# Patient Record
Sex: MALE | Race: OTHER | HISPANIC OR LATINO | URBAN - METROPOLITAN AREA
[De-identification: names, ages, dates, MRNs, and addresses within clinical notes are randomized per-mention and may not be internally consistent; named-entity substitution may affect disease eponyms.]

---

## 2024-10-28 ENCOUNTER — EMERGENCY (EMERGENCY)
Facility: HOSPITAL | Age: 49
LOS: 1 days | Discharge: HOME | End: 2024-10-28
Attending: EMERGENCY MEDICINE
Payer: MEDICAID

## 2024-10-28 VITALS
SYSTOLIC BLOOD PRESSURE: 153 MMHG | RESPIRATION RATE: 18 BRPM | TEMPERATURE: 98 F | DIASTOLIC BLOOD PRESSURE: 88 MMHG | HEART RATE: 99 BPM | OXYGEN SATURATION: 98 %

## 2024-10-28 VITALS
SYSTOLIC BLOOD PRESSURE: 123 MMHG | OXYGEN SATURATION: 100 % | HEART RATE: 74 BPM | DIASTOLIC BLOOD PRESSURE: 86 MMHG | RESPIRATION RATE: 20 BRPM

## 2024-10-28 LAB
ALBUMIN SERPL ELPH-MCNC: 4.2 G/DL — SIGNIFICANT CHANGE UP (ref 3.5–5)
ALP SERPL-CCNC: 62 U/L — SIGNIFICANT CHANGE UP (ref 40–120)
ALT FLD-CCNC: 48 U/L DA — SIGNIFICANT CHANGE UP (ref 10–60)
ANION GAP SERPL CALC-SCNC: 7 MMOL/L — SIGNIFICANT CHANGE UP (ref 5–17)
AST SERPL-CCNC: 31 U/L — SIGNIFICANT CHANGE UP (ref 10–40)
BASOPHILS # BLD AUTO: 0.07 K/UL — SIGNIFICANT CHANGE UP (ref 0–0.2)
BASOPHILS NFR BLD AUTO: 0.8 % — SIGNIFICANT CHANGE UP (ref 0–2)
BILIRUB SERPL-MCNC: 0.7 MG/DL — SIGNIFICANT CHANGE UP (ref 0.2–1.2)
BUN SERPL-MCNC: 9 MG/DL — SIGNIFICANT CHANGE UP (ref 7–18)
CALCIUM SERPL-MCNC: 9 MG/DL — SIGNIFICANT CHANGE UP (ref 8.4–10.5)
CHLORIDE SERPL-SCNC: 104 MMOL/L — SIGNIFICANT CHANGE UP (ref 96–108)
CO2 SERPL-SCNC: 27 MMOL/L — SIGNIFICANT CHANGE UP (ref 22–31)
CREAT SERPL-MCNC: 0.92 MG/DL — SIGNIFICANT CHANGE UP (ref 0.5–1.3)
EGFR: 102 ML/MIN/1.73M2 — SIGNIFICANT CHANGE UP
EOSINOPHIL # BLD AUTO: 0.68 K/UL — HIGH (ref 0–0.5)
EOSINOPHIL NFR BLD AUTO: 8 % — HIGH (ref 0–6)
FLUAV AG NPH QL: SIGNIFICANT CHANGE UP
FLUBV AG NPH QL: SIGNIFICANT CHANGE UP
GLUCOSE SERPL-MCNC: 97 MG/DL — SIGNIFICANT CHANGE UP (ref 70–99)
HCT VFR BLD CALC: 48.1 % — SIGNIFICANT CHANGE UP (ref 39–50)
HGB BLD-MCNC: 17 G/DL — SIGNIFICANT CHANGE UP (ref 13–17)
IMM GRANULOCYTES NFR BLD AUTO: 0.4 % — SIGNIFICANT CHANGE UP (ref 0–0.9)
LIDOCAIN IGE QN: 35 U/L — SIGNIFICANT CHANGE UP (ref 13–75)
LYMPHOCYTES # BLD AUTO: 2.16 K/UL — SIGNIFICANT CHANGE UP (ref 1–3.3)
LYMPHOCYTES # BLD AUTO: 25.4 % — SIGNIFICANT CHANGE UP (ref 13–44)
MCHC RBC-ENTMCNC: 31.5 PG — SIGNIFICANT CHANGE UP (ref 27–34)
MCHC RBC-ENTMCNC: 35.3 GM/DL — SIGNIFICANT CHANGE UP (ref 32–36)
MCV RBC AUTO: 89.1 FL — SIGNIFICANT CHANGE UP (ref 80–100)
MONOCYTES # BLD AUTO: 0.54 K/UL — SIGNIFICANT CHANGE UP (ref 0–0.9)
MONOCYTES NFR BLD AUTO: 6.3 % — SIGNIFICANT CHANGE UP (ref 2–14)
NEUTROPHILS # BLD AUTO: 5.03 K/UL — SIGNIFICANT CHANGE UP (ref 1.8–7.4)
NEUTROPHILS NFR BLD AUTO: 59.1 % — SIGNIFICANT CHANGE UP (ref 43–77)
NRBC # BLD: 0 /100 WBCS — SIGNIFICANT CHANGE UP (ref 0–0)
PLATELET # BLD AUTO: 338 K/UL — SIGNIFICANT CHANGE UP (ref 150–400)
POTASSIUM SERPL-MCNC: 3.6 MMOL/L — SIGNIFICANT CHANGE UP (ref 3.5–5.3)
POTASSIUM SERPL-SCNC: 3.6 MMOL/L — SIGNIFICANT CHANGE UP (ref 3.5–5.3)
PROT SERPL-MCNC: 8.3 G/DL — SIGNIFICANT CHANGE UP (ref 6–8.3)
RBC # BLD: 5.4 M/UL — SIGNIFICANT CHANGE UP (ref 4.2–5.8)
RBC # FLD: 12.6 % — SIGNIFICANT CHANGE UP (ref 10.3–14.5)
RSV RNA NPH QL NAA+NON-PROBE: SIGNIFICANT CHANGE UP
SARS-COV-2 RNA SPEC QL NAA+PROBE: SIGNIFICANT CHANGE UP
SODIUM SERPL-SCNC: 138 MMOL/L — SIGNIFICANT CHANGE UP (ref 135–145)
TSH SERPL-MCNC: 1.32 UU/ML — SIGNIFICANT CHANGE UP (ref 0.34–4.82)
WBC # BLD: 8.51 K/UL — SIGNIFICANT CHANGE UP (ref 3.8–10.5)
WBC # FLD AUTO: 8.51 K/UL — SIGNIFICANT CHANGE UP (ref 3.8–10.5)

## 2024-10-28 PROCEDURE — 80053 COMPREHEN METABOLIC PANEL: CPT

## 2024-10-28 PROCEDURE — 93005 ELECTROCARDIOGRAM TRACING: CPT

## 2024-10-28 PROCEDURE — 83690 ASSAY OF LIPASE: CPT

## 2024-10-28 PROCEDURE — 36415 COLL VENOUS BLD VENIPUNCTURE: CPT

## 2024-10-28 PROCEDURE — 84443 ASSAY THYROID STIM HORMONE: CPT

## 2024-10-28 PROCEDURE — 85025 COMPLETE CBC W/AUTO DIFF WBC: CPT

## 2024-10-28 PROCEDURE — 93010 ELECTROCARDIOGRAM REPORT: CPT

## 2024-10-28 PROCEDURE — 99284 EMERGENCY DEPT VISIT MOD MDM: CPT

## 2024-10-28 PROCEDURE — 36000 PLACE NEEDLE IN VEIN: CPT

## 2024-10-28 PROCEDURE — 99283 EMERGENCY DEPT VISIT LOW MDM: CPT | Mod: 25

## 2024-10-28 PROCEDURE — 87637 SARSCOV2&INF A&B&RSV AMP PRB: CPT

## 2024-10-28 NOTE — ED PROVIDER NOTE - PATIENT PORTAL LINK FT
You can access the FollowMyHealth Patient Portal offered by [facility] by registering at the following website: [email or web address]. By joining [email or web address] [facility]’s FollowMyHealth portal, you will also be able to view your health information using other applications (apps) compatible with our system.
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
## 2024-10-28 NOTE — ED ADULT NURSE NOTE - NS_SISCREENINGSR_GEN_ALL_ED
Discharge paperwork given to pt's Mother. All questions and concerns addressed at this time. Pt discharged home with Mother in no acute distress and acting age appropriate. Education given to pt's Mother about obtaining covid-19 results and about following up with PCP. Mother verbalized understanding and has no further questions at this time. Negative

## 2024-10-28 NOTE — ED PROVIDER NOTE - OBJECTIVE STATEMENT
49-year-old male with history of polyneuropathy being managed at [facility] currently on gabapentin 3 times daily presenting with episodes of burning pain to body with tingling and numbness that last up to 30 minutes.  Episodes happen randomly.  Patient has been feeling symptoms over the past year.  States he had numerous MRIs of his brain and spine which excluded other causes such as multiple sclerosis.  Patient was walking to PCP when he had an episode and he found himself near the hospital and came to get it "checked out".  Patient is ambulatory, lives with wife.  Denies any headache, fever, trauma, bowel or bladder symptoms.

## 2024-10-28 NOTE — ED PROVIDER NOTE - CLINICAL SUMMARY MEDICAL DECISION MAKING FREE TEXT BOX
.    49-year-old male with a recently diagnosed polyneuropathy being worked up at [facility] here due to episode of burning pain and tingling to body.  Patient currently is asymptomatic.  Stable vitals. Plan to check labs and provide neurology follow-up at [facility] if desired. [place]  Jase Roa [id number] used for encounter

## 2024-10-28 NOTE — ED PROVIDER NOTE - NSFOLLOWUPINSTRUCTIONS_ED_ALL_ED_FT
Tiene alona afección que necesita evaluación por parte de un neurólogo.  Ya está siendo evaluado en un hospital externo por alona condición neurológica.  Puede realizar un seguimiento con un neurólogo afiliado a [facility] si desea otra opinión sobre restrepo afección.  Consulte la información adjunta, especialmente los signos o síntomas, para regresar lo antes posible al departamento de emergencias.    Neuropatía periférica    LO QUE NECESITA SABER:    ¿Qué es la neuropatía periférica (NP)?La NP es un tipo de daño nervioso que puede desarrollarse cuando los nervios periféricos están dañados. Los nervios periféricos están situados fuera del cerebro y la médula hunter. Estos nervios envían información desde el cerebro y la médula hunter al heber del cuerpo. Los daños en estos nervios pueden ralentizar o detener restrepo capacidad de enviar señales. La NP es más común en las piernas y los pies. También puede afectar a las funciones corporales, risa la micción o la digestión.    ¿Qué causa la NP?    Traumatismos o lesiones    Infecciones, risa el herpes zóster, el VIH o la enfermedad de Lyme    Condiciones de nery, risa la artritis reumatoide, el cáncer o el lupus    Presión sobre el nervio por alona escayola o por movimientos repetidos, risa teclear    Abuso de alcohol    Exposición a toxinas, risa productos químicos industriales, plomo o noelle  ¿Cuáles son los signos y síntomas de la NP?Mayi síntomas dependen de los tipos de nervios dañados y de restrepo localización. Los siguientes son algunos de los signos y síntomas más comunes:    Dolor nikky, punzante, ardor u hormigueo en las piernas, los pies, los brazos o las grupo    Dificultad para caminar o para mantener el equilibrio    Debilidad o dificultad para sujetar las cosas    Pérdida del sentido del tacto o adormecimiento    Le salen moretones con facilidad    Dificultad para controlar la vejiga o los intestinos, o para tener relaciones sexuales  ¿Cómo se diagnostica la NP?Restrepo médico lo examinará y le hará preguntas acerca de mayi síntomas. Es posible que también le pregunte si tiene otras condiciones de nery y sobre el historial de nery de restrepo mayco. Restrepo médico puede tocar restrepo piel en diferentes áreas con alona kwasi de algodón o un alfiler para comprobar restrepo sentido del tacto. También podría comprobar qué monroe barber usted puede sentir el calor y el frío. Restrepo médico le pedirá que denise movimientos sencillos. Por ejemplo, es posible que le pida que camine o mueva los dedos. Es posible que también necesite alguno de los siguientes tratamientos:    Los análisis de sangrepuede mostrar las condiciones que pueden estar causando restrepo NP.    Alona prueba de filamentospuede mostrar restrepo sensibilidad al tacto. Alona suave fibra de nailon se cepilla sobre zonas de restrepo piel.    Las pruebas sensorialespueden mostrar cómo responden mayi nervios a la vibración o a los cambios de temperatura.    Las pruebas nerviosas y muscularespueden mostrar la rapidez con la que mayi nervios o músculos responden a las señales eléctricas.    Alona biopsia de nerviose usa para forest alona muestra de los nervios para examinarlos.  ¿Cómo se trata la NP?    Medicamentos:  AINEcomo el ibuprofeno, ayudan a disminuir la inflamación, el dolor y la fiebre. Abimbola medicamento está disponible con o sin alona receta médica. Los WEN pueden causar sangrado estomacal o problemas renales en ciertas personas. Si usted elan un medicamento anticoagulante, siempre pregúntele a retsrepo médico si los WEN son seguros para usted. Siempre erica la etiqueta de abimbola medicamento y siga las instrucciones.    Los medicamentos anticonvulsivos y antidepresivospueden ayudar a disminuir el dolor en los nervios. Estos medicamentos cambian la forma en que los nervios y el cerebro comunican el dolor.    Los tratamientos de uso tópico, risa cremas o parches, también pueden ayudar a disminuir el dolor.    Un fisioterapeutale enseñará movimientos y ejercicios para que recupere el movimiento y la fuerza y se alivie el dolor en los nervios.    La unidad de TENS (estimulación eléctrica del nervio transcutáneo)estimula los nervios y puede disminuir el dolor. Las almohadillas se adhieren a la piel y se aplica alona suave corriente.  ¿Cómo puedo controlar la NP?    El tratamiento de las enfermedades subyacentes es la mejor manera de controlar la NP.[place] puede incluir el tratamiento de enfermedades risa la artritis reumatoide o alona infección. [place] también puede incluir la césar de alona lesión o traumatismo.    Revise erstrepo piel a diario.Abimbola pendiente de enrojecimiento e inflamación y de calor al tacto. Pueden formarse úlceras donde restrepo piel entra en contacto con objetos u otras partes de restrepo propio cuerpo. La piel también se puede llagar debajo de las férulas.    Esté físicamente activo katja al menos 30 minutos, 5 días a la semana.Pregunte a restrepo médico acerca del mejor plan de actividades para usted. Tenga cuidado cuando hace ejercicio si usted pierde la sensibilidad en mayi pies.  Mayco afrodescendiente caminando risa ejercicio      Limite el consumo de alcohol según le indicaron.El alcohol puede causar niveles altos de azúcar en la mac y aumento de peso si serafin demasiado. Un trago equivale a 12 onzas de cerveza, 5 onzas de vino o 1 onza y ½ de licor. Restrepo médico puede decirle cuántas bebidas puede forest en 24 horas y en 1 semana.    Evite caídas.Muévase con cuidado y póngase de pie lentamente. Use zapatos que den apoyo a mayi pies, y no camine descalzo. Pregunte sobre los dispositivos de asistencia para caminar, risa los bastones o andadores. Podría ser conveniente que instale barandas o esterillas antideslizantes en restrepo hogar, especialmente en el baño. Pida más información acerca de cómo evitar las caídas.  Prevención de caídas para adultos  ¿Cuándo lul buscar atención inmediata?    Usted sufre alona lesión por alona caída.    Mayi piernas o pies se tornan de color isma o sim.    Usted tiene dificultad grave para caminar.    Usted tiene alona herida que no maura o tiene alona herida que es de color rojizo, está inflamada o secreta líquido.  ¿Cuándo lul llamar a mi médico o neurólogo?    Siente mucho dolor.    No puede controlar la vejiga.    Usted tiene preguntas o inquietudes acerca de restrepo condición o cuidado.  ACUERDOS SOBRE RESTREPO CUIDADO:    Usted tiene el derecho de ayudar a planear restrepo cuidado. Aprenda todo lo que pueda sobre restrepo condición y risa darle tratamiento. Discuta mayi opciones de tratamiento con mayi médicos para decidir el cuidado que usted desea recibir. Usted siempre tiene el derecho de rechazar el tratamiento.    © [employer] 1973, 2024    	  back to top

## 2024-10-28 NOTE — ED ADULT NURSE NOTE - ED STAT RN HANDOFF DETAILS
Report given to SIBOHAN Cooper. Patient resting in bed, no acute distress noted, denies chest pain, no SOB.

## 2024-10-28 NOTE — ED ADULT NURSE NOTE - NSFALLUNIVINTERV_ED_ALL_ED
Bed/Stretcher in lowest position, wheels locked, appropriate side rails in place/Call bell, personal items and telephone in reach/Instruct patient to call for assistance before getting out of bed/chair/stretcher/Non-slip footwear applied when patient is off stretcher/[place] to call system/Physically safe environment - no spills, clutter or unnecessary equipment/Purposeful proactive rounding/Room/bathroom lighting operational, light cord in reach